# Patient Record
Sex: FEMALE | Race: ASIAN | Employment: STUDENT | URBAN - METROPOLITAN AREA
[De-identification: names, ages, dates, MRNs, and addresses within clinical notes are randomized per-mention and may not be internally consistent; named-entity substitution may affect disease eponyms.]

---
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
## 2020-02-16 ENCOUNTER — HEALTH MAINTENANCE LETTER (OUTPATIENT)
Age: 20
End: 2020-02-16

## 2020-02-26 DIAGNOSIS — L70.8 OTHER ACNE: ICD-10-CM

## 2020-02-27 RX ORDER — CLINDAMYCIN PHOSPHATE AND BENZOYL PEROXIDE 10; 50 MG/G; MG/G
GEL TOPICAL
Qty: 45 G | Refills: 11 | OUTPATIENT
Start: 2020-02-27

## 2020-03-02 ENCOUNTER — ANCILLARY PROCEDURE (OUTPATIENT)
Dept: GENERAL RADIOLOGY | Facility: CLINIC | Age: 20
End: 2020-03-02
Attending: FAMILY MEDICINE
Payer: COMMERCIAL

## 2020-03-02 ENCOUNTER — OFFICE VISIT (OUTPATIENT)
Dept: ORTHOPEDICS | Facility: CLINIC | Age: 20
End: 2020-03-02
Payer: COMMERCIAL

## 2020-03-02 VITALS — BODY MASS INDEX: 27.16 KG/M2 | HEIGHT: 65 IN | WEIGHT: 163 LBS

## 2020-03-02 DIAGNOSIS — M79.672 ACUTE FOOT PAIN, LEFT: ICD-10-CM

## 2020-03-02 DIAGNOSIS — M79.672 ACUTE FOOT PAIN, LEFT: Primary | ICD-10-CM

## 2020-03-02 ASSESSMENT — MIFFLIN-ST. JEOR: SCORE: 1507.3

## 2020-03-02 NOTE — LETTER
RE: Samra Ramirez  [address] [id number]     Dear Colleague,    Thank you for referring your patient, Samra Ramirez, to the [facility] SP[facility]TS [facility]THOPAEDIC WALK IN CLINIC at [facility]. Please see a copy of my visit note below.          SPORTS & ORTHOPEDIC WALK-IN VISIT 3/2/2020    Primary Care Physician: Dr. Ansari    Here today for left foot pain.  Has been present for roughly the last week.  She has been walking more than usual and also involved in intense class and high intensity interval training.  Pain localized to the dorsal aspect of the left foot over the metatarsal area.  Has not noted any swelling or bruising.  Did have pain in this area previously secondary to dance.  No previous diagnosis of stress fracture or bone stress injury.  Pain is worse with walking.  No pain with standing still.  It is better when she was wearing supportive footwear.  She has been resting and the pain has improved over the course the last couple of days.    Reason for visit:     What part of your body is injured / painful?  left foot    What caused the injury /pain? Overuse injury from regular activity    How long ago did your injury occur or pain begin? a week ago    What are your most bothersome symptoms? Pain    How would you characterize your symptom?  aching and dull    What makes your symptoms better? Rest    What makes your symptoms worse? Walking    Have you been previously seen for this problem? No    Medical History:    Any recent changes to your medical history? No    Any new medication prescribed since last visit? No    Have you had surgery on this body part before? No    Social History:    Occupation: student - food science    Handedness: Right    Exercise: tennis class, skating, gym     Review of Systems:    Do you have fever, chills, weight loss? No    Do you have any vision problems? No    Do you have any chest pain or edema? No    Do you have  "any shortness of breath or wheezing?  No    Do you have stomach problems? No    Do you have any numbness or focal weakness? No    Do you have diabetes? No    Do you have problems with bleeding or clotting? No    Do you have an rashes or other skin lesions? No       Past Medical History, Current Medications, and Allergies are reviewed in the electronic medical record as appropriate.       EXAM:Ht 1.638 m (5' 4.5\")   Wt 73.9 kg (163 lb)   BMI 27.55 kg/m       General: Alert, pleasant, no distress  Left foot: warm, well perfused, SILT throughout     Inspection: No soft tissue swelling, ecchymosis, deformity     ROM: Full and symmetrical foot and ankle     Strength: 5/5 in dorsiflexion, plantarflexion, inversion eversion some pain with toe walking.     Palpation: TTP over the mid to distal fourth metatarsal dorsally.  No TTP of the remaining metatarsals or midfoot.     Special Tests: None    Imaging: Three-view radiographs of the left foot are performed and reviewed independently demonstrate no acute fracture dislocation.  No significant degenerative disease.  See EMR for formal radiology report.    Assessment: Patient is a 19 year old female with suspected very early bone stress injury    Recommendations:   Reviewed imaging assessment the patient in detail  Discussed the nature of this injury and have recommended period of rest and immobilization.  At the current time patient is relatively comfortable walking her regular footwear and she would like to continue this in place of formal immobilization in boot or postop shoe.  If there is no improvement or if her pain worsens we will consider either of these 2 options.  Generally discussed return to activity and suggested that she should be pain-free walking before adding impact exercise.  Follow-up PRN.    Fawad Hu MD    "

## 2020-03-02 NOTE — PROGRESS NOTES
SPORTS & ORTHOPEDIC WALK-IN VISIT 3/2/2020    Primary Care Physician: Dr. Ansari    Here today for left foot pain.  Has been present for roughly the last week.  She has been walking more than usual and also involved in intense class and high intensity interval training.  Pain localized to the dorsal aspect of the left foot over the metatarsal area.  Has not noted any swelling or bruising.  Did have pain in this area previously secondary to dance.  No previous diagnosis of stress fracture or bone stress injury.  Pain is worse with walking.  No pain with standing still.  It is better when she was wearing supportive footwear.  She has been resting and the pain has improved over the course the last couple of days.    Reason for visit:     What part of your body is injured / painful?  left foot    What caused the injury /pain? Overuse injury from regular activity    How long ago did your injury occur or pain begin? a week ago    What are your most bothersome symptoms? Pain    How would you characterize your symptom?  aching and dull    What makes your symptoms better? Rest    What makes your symptoms worse? Walking    Have you been previously seen for this problem? No    Medical History:    Any recent changes to your medical history? No    Any new medication prescribed since last visit? No    Have you had surgery on this body part before? No    Social History:    Occupation: student - food science    Handedness: Right    Exercise: tennis class, skating, gym     Review of Systems:    Do you have fever, chills, weight loss? No    Do you have any vision problems? No    Do you have any chest pain or edema? No    Do you have any shortness of breath or wheezing?  No    Do you have stomach problems? No    Do you have any numbness or focal weakness? No    Do you have diabetes? No    Do you have problems with bleeding or clotting? No    Do you have an rashes or other skin lesions? No       Past Medical History,  "Current Medications, and Allergies are reviewed in the electronic medical record as appropriate.       EXAM:Ht 1.638 m (5' 4.5\")   Wt 73.9 kg (163 lb)   BMI 27.55 kg/m      General: Alert, pleasant, no distress  Left foot: warm, well perfused, SILT throughout     Inspection: No soft tissue swelling, ecchymosis, deformity     ROM: Full and symmetrical foot and ankle     Strength: 5/5 in dorsiflexion, plantarflexion, inversion eversion some pain with toe walking.     Palpation: TTP over the mid to distal fourth metatarsal dorsally.  No TTP of the remaining metatarsals or midfoot.     Special Tests: None      Imaging: Three-view radiographs of the left foot are performed and reviewed independently demonstrate no acute fracture dislocation.  No significant degenerative disease.  See EMR for formal radiology report.      Assessment: Patient is a 19 year old female with suspected very early bone stress injury    Recommendations:   Reviewed imaging assessment the patient in detail  Discussed the nature of this injury and have recommended period of rest and immobilization.  At the current time patient is relatively comfortable walking her regular footwear and she would like to continue this in place of formal immobilization in boot or postop shoe.  If there is no improvement or if her pain worsens we will consider either of these 2 options.  Generally discussed return to activity and suggested that she should be pain-free walking before adding impact exercise.  Follow-up PRN.    Fawad Hu MD                  "

## 2020-06-14 ENCOUNTER — MYC REFILL (OUTPATIENT)
Dept: PEDIATRICS | Facility: CLINIC | Age: 20
End: 2020-06-14

## 2020-06-14 DIAGNOSIS — L70.8 OTHER ACNE: ICD-10-CM

## 2020-06-15 NOTE — TELEPHONE ENCOUNTER
Routing refill request to provider for review/approval because:  Drug not on the [facility] refill protocol       Sonia Mcghee RN

## 2020-06-15 NOTE — TELEPHONE ENCOUNTER
clindamycin phos-benzoyl perox (DUAC) 1.2-5 % external gel     Last Written Prescription Date:  02/18/2019  Last Fill Quantity: 45g,   # refills: 11  Last Office Visit: 04/19/2019  Future Office visit:       Routing refill request to provider for review/approval because:  Drug not on the [facility], [facility] or [facility] refill protocol or controlled substance

## 2020-06-16 RX ORDER — CLINDAMYCIN PHOSPHATE AND BENZOYL PEROXIDE 10; 50 MG/G; MG/G
GEL TOPICAL
Qty: 45 G | Refills: 11 | Status: SHIPPED | OUTPATIENT
Start: 2020-06-16 | End: 2021-09-09

## 2020-06-29 NOTE — PROGRESS NOTES
"SUBJECTIVE:     Samra Ramirez is a 19 year old female, here for a routine health maintenance visit.    Patient was roomed by: Charlee Mcdonough MA    [facility]    {Reference  [facility] Pediatric TB Risk Assessment & Follow-Up Options :055955}    Dental visit recommended: Yes,planning on geting to the dentist  Has had dental varnish applied in past 30 days: date planning to do    Cardiac risk assessment:     Family history (males <55, females <65) of angina (chest pain), heart attack, heart surgery for clogged arteries, or stroke: no    Biological parent(s) with a total cholesterol over 240:  no  Dyslipidemia risk:    None  MenB Vaccine: series already completed.    VISION    Corrective lenses: No corrective lenses (H Plus Lens Screening required)  Tool used: Miramontes  Right eye: 10/12.5 (20/25)  Left eye: 10/10 (20/20)  Two Line Difference: no  Visual Acuity: Pass  HEARING   Right Ear:      1000 Hz RESPONSE- on Level: 40 db (Conditioning sound)   1000 Hz: RESPONSE- on Level:   20 db    2000 Hz: RESPONSE- on Level:   20 db    4000 Hz: RESPONSE- on Level:   20 db    6000 Hz: RESPONSE- on Level:   20 db     Left Ear:      6000 Hz: RESPONSE- on Level:   20 db    4000 Hz: RESPONSE- on Level:   20 db    2000 Hz: RESPONSE- on Level:   20 db    1000 Hz: RESPONSE- on Level:   20 db      500 Hz: RESPONSE- on Level: 25 db    Right Ear:       500 Hz: RESPONSE- on Level: 25 db    Hearing Acuity: Pass    Hearing Assessment: normal    PSYCHO-SOCIAL/DEPRESSION  General screening:  {PSC 12-20y:705492}  {PROVIDER INTERVIEW--Depression/Mental health  What do you do to make yourself feel better when you're stressed?  Have you ever had low moods that lasted more than a few hours?  A few days?  Have your moods ever been so low that you thought      of hurting yourself?  Did you act on those      thoughts?  Tell me about that.  If you had those kinds of thoughts in the future,      which adult could you tell?  :139857::\"No " "concerns\"}    ACTIVITIES:  {PROVIDER INTERVIEW--Activities   How do you spend your free time?      After-school activities?   Tell me about your friends.   What, if any, physical activity do you do regularly?      Tell me about that.  :534911}    DRUGS  {PROVIDER INTERVIEW--Drugs  Have you tried alcohol?  Tobacco?  Other drugs?        Prescription drugs?  Tell me more.  Has your use ever gotten you in trouble?  Do family members use any of the above?  :497504::\"Smoking:  no\",\"Passive smoke exposure:  no\",\"Alcohol:  no\",\"Drugs:  no\"}    SEXUALITY  {PROVIDER INTERVIEW--Sexuality  Have you developed feelings of attraction for others?  Have your feelings of               attraction ever caused you distress?  Tell me about that.  Have you explored a physical relationship with anyone (held hands, kissed, had      oral sex, had penis-in-vagina sex)?  (If yes--Have you ever gotten/gotten someone       pregnant?  Have you ever had a sexually       transmitted diseases?  Do you use birth control?        What kind?)  Has anyone ever approached you or touched you in       a way that was unwanted?  Have you ever been      physically or psychologically mistreated by      anyone?  Tell me about that.  :336958}    {Female Menstrual History (Optional):990573}    PROBLEM LIST  Patient Active Problem List   Diagnosis     NO ACTIVE PROBLEMS     Acne     MEDICATIONS  Current Outpatient Medications   Medication Sig Dispense Refill     clindamycin phos-benzoyl perox (DUAC) 1.2-5 % external gel APPLY EXTERNALLY TO THE AFFECTED AREA EVERY NIGHT AT BEDTIME FOR ACNE 45 g 11      ALLERGY  No Known Allergies    IMMUNIZATIONS  Immunization History   Administered Date(s) Administered     DTAP (<7y) 09/27/2001, 11/05/2001, 02/04/2002, 11/04/2002, 08/08/2005     HEPA 04/27/2007, 11/02/2007     HPV 12/07/2011, 03/14/2012, 07/05/2012     Hepatitis B Immunity: Titer 02/04/2002     Hib (PRP-T) 09/27/2001, 02/04/2002     Influenza (H1N1) 12/10/2009, " "01/14/2010     Influenza (IIV3) PF 11/04/2002, 12/04/2002, 10/27/2003, 12/15/2005, 11/06/2006, 11/02/2007, 11/07/2008, 10/21/2009, 10/13/2010, 10/20/2011, 11/21/2012     Influenza Intranasal Vaccine 4 valent 12/02/2014     Influenza Vaccine IM > 6 months Valent IIV4 11/12/2013, 10/12/2018     MMR 09/27/2001, 08/08/2005     Meningococcal (Bexsero ) 08/22/2018, 10/12/2018     Meningococcal (Menactra ) 12/07/2011, 08/22/2018     Pneumococcal (PCV 7) 06/12/2002     Poliovirus, inactivated (IPV) 09/27/2001, 02/04/2002, 11/04/2002, 08/08/2005     TDAP Vaccine (Boostrix) 12/07/2011     Typhoid Oral 04/27/2007     Varicella 12/03/2001, 11/02/2007       HEALTH HISTORY SINCE LAST VISIT  {HEALTH  1:604079::\"No surgery, major illness or injury since last physical exam\"}    ROS  {ROS Choices:880960}    OBJECTIVE:   EXAM  There were no vitals taken for this visit.  No height on file for this encounter.  No weight on file for this encounter.  No height and weight on file for this encounter.  Blood pressure percentiles are not available for patients who are 18 years or older.  {TEEN GENERAL EXAM 9 - 18 Y:287052::\"GENERAL: Active, alert, in no acute distress.\",\"SKIN: Clear. No significant rash, abnormal pigmentation or lesions\",\"HEAD: Normocephalic\",\"EYES: Pupils equal, round, reactive, Extraocular muscles intact. Normal conjunctivae.\",\"EARS: Normal canals. Tympanic membranes are normal; gray and translucent.\",\"NOSE: Normal without discharge.\",\"MOUTH/THROAT: Clear. No oral lesions. Teeth without obvious abnormalities.\",\"NECK: Supple, no masses.  No thyromegaly.\",\"LYMPH NODES: No adenopathy\",\"LUNGS: Clear. No rales, rhonchi, wheezing or retractions\",\"HEART: Regular rhythm. Normal S1/S2. No murmurs. Normal pulses.\",\"ABDOMEN: Soft, non-tender, not distended, no masses or hepatosplenomegaly. Bowel sounds normal. \",\"NEUROLOGIC: No focal findings. Cranial nerves grossly intact: DTR's normal. Normal gait, strength and tone\",\"BACK: Spine " "is straight, no scoliosis.\",\"EXTREMITIES: Full range of motion, no deformities\"}  {/Sports exams:306378}    ASSESSMENT/PLAN:   {Diagnosis Picklist:670571}    Anticipatory Guidance  {ANTICIPATORY 15-18 Y:463543::\"The following topics were discussed:\",\"SOCIAL/ FAMILY:\",\"NUTRITION:\",\"HEALTH / SAFETY:\",\"SEXUALITY:\"}    Preventive Care Plan  Immunizations    {Vaccine counseling is expected when vaccines are given for the first time.   Vaccine counseling would not be expected for subsequent vaccines (after the first of the series) unless there is significant additional documentation:128843::\"Reviewed, up to date\"}  Referrals/Ongoing Specialty care: {C&TC :499341::\"No \"}  See other orders in [facility].  Cleared for sports:  {Yes No Not addressed:249044::\"Yes\"}  BMI at No height and weight on file for this encounter.  {BMI Evaluation - If BMI >/= 85th percentile for age, complete Obesity Action Plan:822351::\"No weight concerns.\"}    FOLLOW-UP:    {  (Optional):147181::\"in 1 year for a Preventive Care visit\"}    Resources  HPV and Cancer Prevention:  What Parents Should Know  What Kids Should Know About HPV and Cancer  Goal Tracker: Be More Active  Goal Tracker: Less Screen Time  Goal Tracker: Drink More Water  Goal Tracker: Eat More Fruits and Veggies  [place] Child and Teen Checkups (C&TC) Schedule of Age-Related Screening Standards    Apolonia Ordoñez MD  [facility] CHILDRENS  "

## 2020-06-29 NOTE — PATIENT INSTRUCTIONS
Patient Education    [facility]- PARENT  15 THROUGH 17 YEAR VISITS  Here are some suggestions from [employer] experts that may be of value to your family.     HOW YOUR FAMILY IS DOING  Set aside time to be with your teen and really listen to her hopes and concerns.  Support your teen in finding activities that interest him. Encourage your teen to help others in the community.  Help your teen find and be a part of positive after-school activities and sports.  Support your teen as she figures out ways to deal with stress, solve problems, and make decisions.  Help your teen deal with conflict.  If you are worried about your living or food situation, talk with us. Community agencies and programs such as SNAP can also provide information.    YOUR GROWING AND CHANGING TEEN  Make sure your teen visits the dentist at least twice a year.  Give your teen a fluoride supplement if the dentist recommends it.  Support your teen s healthy body weight and help him be a healthy eater.  Provide healthy foods.  Eat together as a family.  Be a role model.  Help your teen get enough calcium with low-fat or fat-free milk, low-fat yogurt, and cheese.  Encourage at least 1 hour of physical activity a day.  Praise your teen when she does something well, not just when she looks good.    YOUR TEEN S FEELINGS  If you are concerned that your teen is sad, depressed, nervous, irritable, hopeless, or angry, let us know.  If you have questions about your teen s sexual development, you can always talk with us.    HEALTHY BEHAVIOR CHOICES  Know your teen s friends and their parents. Be aware of where your teen is and what he is doing at all times.  Talk with your teen about your values and your expectations on drinking, drug use, tobacco use, driving, and sex.  Praise your teen for healthy decisions about sex, tobacco, alcohol, and other drugs.  Be a role model.  Know your teen s friends and their activities together.  Lock your  liquor in a cabinet.  Store prescription medications in a locked cabinet.  Be there for your teen when she needs support or help in making healthy decisions about her behavior.    SAFETY  Encourage safe and responsible driving habits.  Lap and shoulder seat belts should be used by everyone.  Limit the number of friends in the car and ask your teen to avoid driving at night.  Discuss with your teen how to avoid risky situations, who to call if your teen feels unsafe, and what you expect of your teen as a .  Do not tolerate drinking and driving.  If it is necessary to keep a gun in your home, store it unloaded and locked with the ammunition locked separately from the gun.      Consistent with Bright Futures: Guidelines for Health Supervision of Infants, Children, and Adolescents, 4th Edition  For more information, go to https://brightfutures.aap.org.           Patient Education

## 2020-06-30 ENCOUNTER — OFFICE VISIT (OUTPATIENT)
Dept: PEDIATRICS | Facility: CLINIC | Age: 20
End: 2020-06-30
Payer: COMMERCIAL

## 2020-06-30 VITALS
WEIGHT: 158.6 LBS | BODY MASS INDEX: 26.42 KG/M2 | HEART RATE: 101 BPM | HEIGHT: 65 IN | TEMPERATURE: 98.1 F | DIASTOLIC BLOOD PRESSURE: 80 MMHG | SYSTOLIC BLOOD PRESSURE: 113 MMHG

## 2020-06-30 DIAGNOSIS — Z00.129 ENCOUNTER FOR ROUTINE CHILD HEALTH EXAMINATION W/O ABNORMAL FINDINGS: Primary | ICD-10-CM

## 2020-06-30 PROCEDURE — 99395 PREV VISIT EST AGE 18-39: CPT | Performed by: PEDIATRICS

## 2020-06-30 PROCEDURE — 96127 BRIEF EMOTIONAL/BEHAV ASSMT: CPT | Performed by: PEDIATRICS

## 2020-06-30 PROCEDURE — 99173 VISUAL ACUITY SCREEN: CPT | Mod: 59 | Performed by: PEDIATRICS

## 2020-06-30 PROCEDURE — 92551 PURE TONE HEARING TEST AIR: CPT | Performed by: PEDIATRICS

## 2020-06-30 ASSESSMENT — MIFFLIN-ST. JEOR: SCORE: 1487.34

## 2020-06-30 NOTE — PROGRESS NOTES
SUBJECTIVE:   Samra Ramirez is a 19 year old female, here for a routine health maintenance visit,   accompanied by her self.    Patient was roomed by: Charlee Mcdonough MA    Do you have any forms to be completed?  YES    SOCIAL HISTORY  Family members in house: mother  Language(s) spoken at home: English  Recent family changes/social stressors: none noted    SAFETY/HEALTH RISKS  TB exposure:           None  Cardiac risk assessment:     Family history (males <55, females <65) of angina (chest pain), heart attack, heart surgery for clogged arteries, or stroke: no    Biological parent(s) with a total cholesterol over 240:  Family history not known  Dyslipidemia risk:    None  MenB Vaccine not indicated.    DENTAL  Water source:  city water  Does your child have a dental provider: Yes  Has your child seen a dentist in the last 6 months: Yes  Dental health HIGH risk factors: none    Dental visit recommended: Yes  Dental varnish declined by parent    Sports Physical:  SPORTS QUESTIONNAIRE:  ======================   School: [employer]                          Grade: Salvatore year in college                 Sports:  Figure skating (Hockey cheerleading)  1.  no - Do you have any concerns that you would like to discuss with your provider?  2.  no - Has a provider ever denied or restricted your participation in sports for any reason?  3.  no - Do you have any ongoing medical issues or recent illness?  4.  no - Have you ever passed out or nearly passed out during or after exercise?   5.  no - Have you ever had discomfort, pain, tightness, or pressure in your chest during exercise?  6.  no - Does your heart ever race, flutter in your chest, or skip beats (irregular beats) during exercise?   7.  no - Has a doctor ever told you that you have any heart problems?  8.  no - Has a doctor ever ordered a test for your heart? For example, electrocardiography (ECG) or echocardiolography (ECHO)?  9.  no - Do you get  lightheaded or feel shorter of breath than your friends during exercise?   10.  no - Have you ever had seizure?   11.  no - Has any family member or relative  of heart problems or had an unexpected or unexplained sudden death before age 35 years (including drowning or unexplained car crash)?  12.  no - Does anyone in your family have a genetic heart problem such as hypertrophic cardiomyopathy (HCM), Marfan Syndrome, arrhythmogenic right ventricular cardiomyopathy (ARVC), long QT syndrome (LQTS), short QT syndrome (SQTS), Brugada syndrome, or catecholaminergic polymorphic ventricular tachycardia (CPVT)?    13.  no - Has anyone in your family had a pacemaker, or implanted defibrillator before age 35?   14.  no - Have you ever had a stress fracture or an injury to a bone, muscle, ligament, joint or tendon that caused you to miss a practice or game?   15.  no - Do you have a bone, muscle, ligament, or joint injury that bothers you?   16.  no - Do you cough, wheeze, or have difficulty breathing during or after exercise?    17.  no -  Are you missing a kidney, an eye, a testicle (males), your spleen, or any other organ?  18.  no - Do you have groin or testicle pain or a painful bulge or hernia in the groin area?  19.  no - Do you have any recurring skin rashes or rashes that come and go, including herpes or methicillin-resistant Staphylococcus aureus (MRSA)?  20.  no - Have you had a concussion or head injury that caused confusion, a prolonged headache, or memory problems?  21. no - Have you ever had numbness, tingling or weakness in your arms or legs or been unable to move your arms or legs after being hit or falling?   22.  no - Have you ever become ill while exercising in the heat?  23.  no - Do you or does someone in your family have sickle cell trait or disease?   24.  no - Have you ever had, or do you have any problems with your eyes or vision?  25.  no - Do you worry about your weight?    26.  no -  Are you  trying to or has anyone recommended that you gain or lose weight?    27.  no -  Are you on a special diet or do you avoid certain types of foods or food groups?  28.  no - Have you ever had an eating disorder?   29. YES - Have you ever had a menstrual period?  30.  How old were you when you had your first menstrual period? 12 years old   31.  When was your most recent  menstrual period?  June 10th, 2020 (two weeks ago)  32. How many menstrual periods have you had in the 12 months?  13  VISION    Corrective lenses: No corrective lenses (H Plus Lens Screening required)  Tool used: Miramontes  Right eye: 10/12.5 (20/25)  Left eye: 10/10 (20/20)  Two Line Difference: no  Visual Acuity: Pass  HEARING   Right Ear:      1000 Hz RESPONSE- on Level: 40 db (Conditioning sound)   1000 Hz: RESPONSE- on Level:   20 db    2000 Hz: RESPONSE- on Level:   20 db    4000 Hz: RESPONSE- on Level:   20 db    6000 Hz: RESPONSE- on Level:   20 db     Left Ear:      6000 Hz: RESPONSE- on Level:   20 db    4000 Hz: RESPONSE- on Level:   20 db    2000 Hz: RESPONSE- on Level:   20 db    1000 Hz: RESPONSE- on Level:   20 db      500 Hz: RESPONSE- on Level: 25 db    Right Ear:       500 Hz: RESPONSE- on Level: 25 db    Hearing Acuity: Pass    Hearing Assessment: normal      HOME  No concerns    EDUCATION  School:  [employer]  Grade:  Salvatore years  Days of school missed: 5 or fewer  School performance / Academic skills: Happy with grades.    SAFETY  Driving:  Seat belt always worn:  Yes  Helmet worn for bicycle/roller blades/skateboard:  Yes  Guns/firearms in the home: No  No safety concerns    ACTIVITIES  Do you get at least 60 minutes per day of physical activity, including time in and out of school: Yes  Extracurricular activities: no  Organized team sports: Figure skating and Hockey cheerleading (figure skating)    During COVID-19 changes, was already doing two on-line classes.    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  Physical activity: Stretching, yoga, walking outside.  Rinks are opening up,and will be able to go skating.  None    ELECTRONIC MEDIA  Media use: > 2 hours/ day      DIET  Do you get at least 4 helpings of a fruit or vegetable every day: Yes  How many servings of juice, non-diet soda, punch or sports drinks per day: Yes  Meals:  Three meals a day with snacks , Body image/shape:  No concerns     and Supplements:  None    PSYCHO-SOCIAL/DEPRESSION  General screening:    No concerns    SLEEP  Sleep concerns: No concerns, sleeps well through night    QUESTIONS/CONCERNS: None    DRUGS  Smoking:  no  Passive smoke exposure:  no  Alcohol:  YES: Occassional  Drugs:  no    SEXUALITY  Sexual attraction:  opposite sex  Sexual activity: No  Contraception/STI Prevention: Abstinence  Unwanted sex:  None    MENSTRUAL HISTORY  Normal       PROBLEM LIST  Patient Active Problem List   Diagnosis     NO ACTIVE PROBLEMS     Acne     MEDICATIONS  Current Outpatient Medications   Medication Sig Dispense Refill     clindamycin phos-benzoyl perox (DUAC) 1.2-5 % external gel APPLY EXTERNALLY TO THE AFFECTED AREA EVERY NIGHT AT BEDTIME FOR ACNE 45 g 11      ALLERGY  No Known Allergies    IMMUNIZATIONS  Immunization History   Administered Date(s) Administered     DTAP (<7y) 09/27/2001, 11/05/2001, 02/04/2002, 11/04/2002, 08/08/2005     HEPA 04/27/2007, 11/02/2007     HPV 12/07/2011, 03/14/2012, 07/05/2012     Hepatitis B Immunity: Titer 02/04/2002     Hib (PRP-T) 09/27/2001, 02/04/2002     Influenza (H1N1) 12/10/2009, 01/14/2010     Influenza (IIV3) PF 11/04/2002, 12/04/2002, 10/27/2003, 12/15/2005, 11/06/2006, 11/02/2007, 11/07/2008, 10/21/2009, 10/13/2010, 10/20/2011, 11/21/2012     Influenza Intranasal Vaccine 4 valent 12/02/2014     Influenza Vaccine IM > 6 months Valent IIV4 11/12/2013, 10/12/2018     MMR 09/27/2001, 08/08/2005     Meningococcal (Bexsero ) 08/22/2018, 10/12/2018     Meningococcal  "(Menactra ) 12/07/2011, 08/22/2018     Pneumococcal (PCV 7) 06/12/2002     Poliovirus, inactivated (IPV) 09/27/2001, 02/04/2002, 11/04/2002, 08/08/2005     TDAP Vaccine (Boostrix) 12/07/2011     Typhoid Oral 04/27/2007     Varicella 12/03/2001, 11/02/2007       HEALTH HISTORY SINCE LAST VISIT  No surgery, major illness or injury since last physical exam    ROS  GENERAL:  NEGATIVE for fever, poor appetite, and sleep disruption.  SKIN:  NEGATIVE for rash, hives, and eczema.  EYE:  NEGATIVE for pain, discharge, redness, itching and vision problems.  ENT:  NEGATIVE for ear pain, runny nose, congestion and sore throat.  RESP:  NEGATIVE for cough, wheezing, and difficulty breathing.  CARDIAC:  NEGATIVE for chest pain and cyanosis.   GI:  NEGATIVE for vomiting, diarrhea, abdominal pain and constipation.  :  NEGATIVE for urinary problems.  NEURO:  NEGATIVE for headache and weakness.  ALLERGY:  As in Allergy History  MSK:  NEGATIVE for muscle problems and joint problems.    OBJECTIVE:   EXAM  /80   Pulse 101   Temp 98.1  F (36.7  C) (Oral)   Ht 5' 4.5\" (1.638 m)   Wt 158 lb 9.6 oz (71.9 kg)   LMP 06/10/2020   Breastfeeding No   BMI 26.80 kg/m    53 %ile (Z= 0.08) based on CDC (Girls, 2-20 Years) Stature-for-age data based on Stature recorded on 6/30/2020.  86 %ile (Z= 1.10) based on CDC (Girls, 2-20 Years) weight-for-age data using vitals from 6/30/2020.  86 %ile (Z= 1.10) based on CDC (Girls, 2-20 Years) BMI-for-age based on BMI available as of 6/30/2020.  Blood pressure percentiles are not available for patients who are 18 years or older.  GENERAL: Active, alert, in no acute distress.  SKIN: Clear. No significant rash, abnormal pigmentation or lesions  HEAD: Normocephalic  EYES: Pupils equal, round, reactive, Extraocular muscles intact. Normal conjunctivae.  EARS: Normal canals. Tympanic membranes are normal; gray and translucent.  NOSE: Normal without discharge.  MOUTH/THROAT: Clear. No oral lesions. Teeth " without obvious abnormalities.  NECK: Supple, no masses.  No thyromegaly.  LYMPH NODES: No adenopathy  LUNGS: Clear. No rales, rhonchi, wheezing or retractions  HEART: Regular rhythm. Normal S1/S2. No murmurs. Normal pulses.  ABDOMEN: Soft, non-tender, not distended, no masses or hepatosplenomegaly. Bowel sounds normal.   NEUROLOGIC: No focal findings. Cranial nerves grossly intact: DTR's normal. Normal gait, strength and tone  BACK: Spine is straight, no scoliosis.  EXTREMITIES: Full range of motion, no deformities  : Exam not indicated    ASSESSMENT/PLAN:   1. Encounter for routine child health examination w/o abnormal findings    - PURE TONE HEARING TEST, AIR  - SCREENING, VISUAL ACUITY, QUANTITATIVE, BILAT  - BEHAVIORAL / EMOTIONAL ASSESSMENT [[id number]]    Anticipatory Guidance  The following topics were discussed:  SOCIAL/ FAMILY:    Social media    School/ homework    Future plans/ College  NUTRITION:    Healthy food choices    Family meals    Weight management  HEALTH / SAFETY:    Adequate sleep/ exercise    Drugs, ETOH, smoking    Swimming/ water safety    Bike/ sport helmets  SEXUALITY:    Dating/ relationships    Safe sex/ STDs    Preventive Care Plan  Immunizations    Reviewed, up to date  Referrals/Ongoing Specialty care: No   See other orders in [facility].  Cleared for sports:  Yes  BMI at 86 %ile (Z= 1.10) based on CDC (Girls, 2-20 Years) BMI-for-age based on BMI available as of 6/30/2020.  No weight concerns.    FOLLOW-UP:    in 1 year for a Preventive Care visit    Resources  HPV and Cancer Prevention:  What Parents Should Know  What Kids Should Know About HPV and Cancer  Goal Tracker: Be More Active  Goal Tracker: Less Screen Time  Goal Tracker: Drink More Water  Goal Tracker: Eat More Fruits and Veggies  [place] Child and Teen Checkups (C&TC) Schedule of Age-Related Screening Standards    Apolonia Ordoñez MD  [facility]

## 2021-09-08 SDOH — ECONOMIC STABILITY: INCOME INSECURITY: IN THE LAST 12 MONTHS, WAS THERE A TIME WHEN YOU WERE NOT ABLE TO PAY THE MORTGAGE OR RENT ON TIME?: NO

## 2021-09-09 ENCOUNTER — OFFICE VISIT (OUTPATIENT)
Dept: PEDIATRICS | Facility: CLINIC | Age: 21
End: 2021-09-09
Payer: COMMERCIAL

## 2021-09-09 VITALS
DIASTOLIC BLOOD PRESSURE: 81 MMHG | HEART RATE: 85 BPM | BODY MASS INDEX: 27.12 KG/M2 | HEIGHT: 65 IN | WEIGHT: 162.8 LBS | SYSTOLIC BLOOD PRESSURE: 128 MMHG | TEMPERATURE: 98.1 F

## 2021-09-09 DIAGNOSIS — Z00.129 ENCOUNTER FOR ROUTINE CHILD HEALTH EXAMINATION W/O ABNORMAL FINDINGS: Primary | ICD-10-CM

## 2021-09-09 DIAGNOSIS — L70.0 ACNE VULGARIS: ICD-10-CM

## 2021-09-09 PROCEDURE — 90715 TDAP VACCINE 7 YRS/> IM: CPT | Performed by: PEDIATRICS

## 2021-09-09 PROCEDURE — 90471 IMMUNIZATION ADMIN: CPT | Performed by: PEDIATRICS

## 2021-09-09 PROCEDURE — 96127 BRIEF EMOTIONAL/BEHAV ASSMT: CPT | Performed by: PEDIATRICS

## 2021-09-09 PROCEDURE — 99395 PREV VISIT EST AGE 18-39: CPT | Mod: 25 | Performed by: PEDIATRICS

## 2021-09-09 PROCEDURE — 90472 IMMUNIZATION ADMIN EACH ADD: CPT | Performed by: PEDIATRICS

## 2021-09-09 PROCEDURE — 90686 IIV4 VACC NO PRSV 0.5 ML IM: CPT | Performed by: PEDIATRICS

## 2021-09-09 RX ORDER — CLINDAMYCIN PHOSPHATE AND BENZOYL PEROXIDE 10; 50 MG/G; MG/G
GEL TOPICAL
Qty: 45 G | Refills: 4 | Status: SHIPPED | OUTPATIENT
Start: 2021-09-09

## 2021-09-09 ASSESSMENT — MIFFLIN-ST. JEOR: SCORE: 1505.59

## 2021-09-09 NOTE — PATIENT INSTRUCTIONS
Patient Education    [facility] PATIENT  18 THROUGH 21 YEAR VISITS  Here are some suggestions from [employer] experts that may be of value to your family.     HOW YOU ARE DOING  Enjoy spending time with your family.  Find activities you are really interested in, such as sports, theater, or volunteering.  Try to be responsible for your schoolwork or work obligations.  Always talk through problems and never use violence.  If you get angry with someone, try to walk away.  If you feel unsafe in your home or have been hurt by someone, let us know. Hotlines and community agencies can also provide confidential help.  Talk with us if you are worried about your living or food situation. Community agencies and programs such as SNAP can help.  Don t smoke, vape, or use drugs. Avoid people who do when you can. Talk with us if you are worried about alcohol or drug use in your family.    YOUR DAILY LIFE  Visit the dentist at least twice a year.  Brush your teeth at least twice a day and floss once a day.  Be a healthy eater.  Have vegetables, fruits, lean protein, and whole grains at meals and snacks.  Limit fatty, sugary, salty foods that are low in nutrients, such as candy, chips, and ice cream.  Eat when you re hungry. Stop when you feel satisfied.  Eat breakfast.  Drink plenty of water.  Make sure to get enough calcium every day.  Have 3 or more servings of low-fat (1%) or fat-free milk and other low-fat dairy products, such as yogurt and cheese.  Women: Make sure to eat foods rich in folate, such as fortified grains and dark- green leafy vegetables.  Aim for at least 1 hour of physical activity every day.  Wear safety equipment when you play sports.  Get enough sleep.  Talk with us about managing your health care and insurance as an adult.    YOUR FEELINGS  Most people have ups and downs. If you are feeling sad, depressed, nervous, irritable, hopeless, or angry, let us know or reach out to another health  care professional.  Figure out healthy ways to deal with stress.  Try your best to solve problems and make decisions on your own.  Sexuality is an important part of your life. If you have any questions or concerns, we are here for you.    HEALTHY BEHAVIOR CHOICES  Avoid using drugs, alcohol, tobacco, steroids, and diet pills. Support friends who choose not to use.  If you use drugs or alcohol, let us know or talk with another trusted adult about it. We can help you with quitting or cutting down on your use.  Make healthy decisions about your sexual behavior.  If you are sexually active, always practice safe sex. Always use birth control along with a condom to prevent pregnancy and sexually transmitted infections.  All sexual activity should be something you want. No one should ever force or try to convince you.  Protect your hearing at work, home, and concerts. Keep your earbud volume down.    STAYING SAFE  Always be a safe and cautious .  Insist that everyone use a lap and shoulder seat belt.  Limit the number of friends in the car and avoid driving at night.  Avoid distractions. Never text or talk on the phone while you drive.  Do not ride in a vehicle with someone who has been using drugs or alcohol.  If you feel unsafe driving or riding with someone, call someone you trust to drive you.  Wear helmets and protective gear while playing sports. Wear a helmet when riding a bike, a motorcycle, or an ATV or when skiing or skateboarding.  Always use sunscreen and a hat when you re outside.  Fighting and carrying weapons can be dangerous. Talk with your parents, teachers, or doctor about how to avoid these situations.        Consistent with Bright Futures: Guidelines for Health Supervision of Infants, Children, and Adolescents, 4th Edition  For more information, go to https://brightfutures.aap.org.

## 2021-09-09 NOTE — PROGRESS NOTES
Samra Ramirez is 20 year old, here for a preventive care visit.    Assessment & Plan   033638}  (Z00.129) Encounter for routine child health examination w/o abnormal findings  (primary encounter diagnosis)  Comment:  Doing well.  Plan: BEHAVIORAL/EMOTIONAL ASSESSMENT (45383),         SCREENING TEST, PURE TONE, AIR ONLY, SCREENING,        VISUAL ACUITY, QUANTITATIVE, BILAT, TDAP         VACCINE (Adacel, Boostrix)  [[id number]],         INFLUENZA VACCINE IM >6 MO VALENT IIV4         (ALFURIA/FLUZONE)        Was nervous and a little tearful when I asked more sensitive questions.  Showed patient EFT tapping to help calm stress response, and gave handout.    (L70.0) Acne vulgaris  Comment: Renewed Rx for this, as current tube is running out.  Plan: clindamycin phos-benzoyl perox (DUAC) 1.2-5 %         external gel         Growth        No weight concerns.    Immunizations     Vaccines up to date.  Appropriate vaccinations were ordered.  MenB Vaccine not indicated.    Anticipatory Guidance    Reviewed age appropriate anticipatory guidance.   The following topics were discussed:  SOCIAL/ FAMILY:    Increased responsibility    Parent/ teen communication    Limits/ consequences    Social media    TV/ media    Future plans/ College  NUTRITION:    Healthy food choices    Family meals    Weight management  HEALTH / SAFETY:    Adequate sleep/ exercise    Dental care    Drugs, ETOH, smoking    Body image    Seat belts  SEXUALITY:    Dating/ relationships    Contraception     Safe sex/ STDs        Referrals/Ongoing Specialty Care  No    Follow Up      No follow-ups on file.    Patient has been advised of split billing requirements and indicates understanding: Yes      Subjective     Additional Questions 9/9/2021   Do you have any questions today that you would like to discuss? Yes   Questions adult referral       Social 9/8/2021   Who do you live with? Family   Have you experienced any stressful events recently? None   In the past  12 months, has lack of transportation kept you from medical appointments or from getting medications? No   In the last 12 months, was there a time when you were not able to pay the mortgage or rent on time? No   In the last 12 months, was there a time when you did not have a steady place to sleep or slept in a shelter (including now)? No       Health Risks/Safety 9/8/2021   Do you always wear a seat belt? Yes   Do you wear a helmet for bicyle, rollerblades, skatebard, scooter, skiing/snowboarding, ATV/snowmobile, motorcycle?  Yes       TB Screening 9/8/2021   Were you born outside of the [place]? (!) YES   Which country?  [place]     TB Screening 9/8/2021   Since your last Well Child visit, have any of your family members or close contacts had tuberculosis or a positive tuberculosis test?  No   Since your last check-up, have you or any of your family members or close contacts traveled or lived outside of the [place]? No   Since your last check-up, have you lived in a high-risk group setting like a correctional facility, health care facility, homeless shelter, or refugee camp? No           Dyslipidemia Screening 9/8/2021   Have any of your parents or grandparents had a stroke or heart attack before age 55 for males or before age 65 for females? (!) UNKNOWN   Do either of your parents have high cholesterol or currently taking medications to treat? (!) UNKNOWN    Risk Factors: None    No flowsheet data found.  Dental Fluoride Varnish:   No, No.  Patient has her own dentist she sees regularly..  Diet 9/8/2021   Do you have questions about your eating?  No   Do you have questions about your weight?  No   What do you regularly drink? Water, Cow's Milk, (!) JUICE   What type of water? Tap, (!) FILTERED   Do you think you eat healthy foods? Yes   Do you get at least 3 servings of food or beverages that have calcium each day (dairy, green leafy vegetables, etc.)? Yes   How would you describe your diet?  No  restrictions   Within the past 12 months, you worried that your food would run out before you got money to buy more. Never true   Within the past 12 months, the food you bought just didn't last and you didn't have money to get more. Never true       Activity 9/8/2021   On average, how many days per week do you engage in moderate to strenuous exercise (like walking fast, running, jogging, dancing, swimming, biking, or other activities that cause a light or heavy sweat)? 3 days   On average, how many minutes do you engage in exercise at this level? 60 minutes   What do you do for exercise? Walking, ice skating, swimming   What activities are you involved with? [employer], [facility] [employer] program, food science and nutrition club     Media Use 9/8/2021   How many hours per day are you viewing a screen?  6     Sleep 9/8/2021   Do you have any trouble with sleep? No     Vision/Hearing 9/8/2021   Do you have any concerns about your hearing or vision?  No concerns       School 9/8/2021   Are you in school? Yes   What school do you attend?  [employer]   What do you do for work? [employer],        Psycho-Social/Depression  General screening:    Electronic PSC   PSC SCORES 8/22/2018   Inattentive / Hyperactive Symptoms Subtotal 0   Externalizing Symptoms Subtotal 0   Internalizing Symptoms Subtotal 1   PSC - 17 Total Score 1      no followup necessary    Teen Screen    HOME:  Lives at home to save money (will be graduating at the end of this semester).    EDUCATION  School: Senior at the [employer] undergraduate.  Food Science major.  Would like to do .  Grades: Doing well.    ACTIVITIES:  Hobbies:  Recreational reading, Netflix  Sports: At the [employer] club, skates for fun    DIET:  Pop and processed foods: trying to eat fewer processed foods  Whole foods: tries to emphasize that  Concerns: None    DRUGS:  Vaping; never  EtOH: safe  "practices  Tobacco: never  Marijuana: never  Other: none    SLEEP:  Lights off on school night: 11pm or midnight  Asleep quickly  Wakes up on school day: up by 8 am.  Naps: none    SEXUALITY:  Gender: Cis  Attraction: males  Activity: Never yet  Concerns: None    MOODS:  Anxious thoughts or feelings: none  Sad/angry/ low-mood thoughts or feelings: none  Ever thought of hurting self: never  Coping strategies: reading, connecting with family, relaxing      [id number]}  AMB [facility] MENSES SECTION 9/8/2021   What are your periods like?  Regular       General:  normal energy and appetite.  Skin:  no rash, hives, other lesions.  Eyes:  no pain, discharge, redness, itching.  ENT:  no earache, sneezing, nasal congestion, sinus pain.  Respiratory:  no cough, wheeze, respiratory distress.  Cardiovascular:  no tachycardia, palpitations, syncope.  Gastrointestinal:  no nausea, vomiting, diarrhea, constipation, abdominal pain.  Musculoskeletal:  no myalgia or arthralgia.       Objective     Exam  /81   Pulse 85   Temp 98.1  F (36.7  C) (Oral)   Ht 5' 4.76\" (1.645 m)   Wt 162 lb 12.8 oz (73.8 kg)   BMI 27.29 kg/m    Facility age limit for growth percentiles is 20 years.  Facility age limit for growth percentiles is 20 years.  Facility age limit for growth percentiles is 20 years.  Growth percentile SmartLinks can only be used for patients less than 20 years old.  GENERAL: Active, alert, in no acute distress.  SKIN: Clear. No significant rash, abnormal pigmentation or lesions  HEAD: Normocephalic  EYES: Pupils equal, round, reactive, Extraocular muscles intact. Normal conjunctivae.  EARS: Normal canals. Tympanic membranes are normal; gray and translucent.  NOSE: Normal without discharge.  MOUTH/THROAT: Clear. No oral lesions. Teeth without obvious abnormalities.  NECK: Supple, no masses.  No thyromegaly.  LYMPH NODES: No adenopathy  LUNGS: Clear. No rales, rhonchi, wheezing or retractions  HEART: Regular rhythm. Normal S1/S2. " No murmurs. Normal pulses.  ABDOMEN: Soft, non-tender, not distended, no masses or hepatosplenomegaly. Bowel sounds normal.   NEUROLOGIC: No focal findings. Cranial nerves grossly intact: DTR's normal. Normal gait, strength and tone  BACK: Spine is straight, no scoliosis.  EXTREMITIES: Full range of motion, no deformities  : Exam deferred.     No Marfan stigmata: kyphoscoliosis, high-arched palate, pectus excavatuM, arachnodactyly, arm span > height, hyperlaxity, myopia, MVP, aortic insufficieny)  Eyes: normal fundoscopic and pupils  Cardiovascular: normal PMI, simultaneous femoral/radial pulses, no murmurs (standing, supine, Valsalva)  Skin: no HSV, MRSA, tinea corporis  Musculoskeletal    Neck: normal    Back: normal    Shoulder/arm: normal    Elbow/forearm: normal    Wrist/hand/fingers: normal    Hip/thigh: normal    Knee: normal    Leg/ankle: normal    Foot/toes: normal    Functional (Single Leg Hop or Squat): normal      Apolonia Ordoñez MD  [facility]

## 2022-11-20 ENCOUNTER — HEALTH MAINTENANCE LETTER (OUTPATIENT)
Age: 22
End: 2022-11-20

## 2023-02-07 NOTE — MR AVS SNAPSHOT
After Visit Summary   10/12/2018    Samra Ramirez    MRN: [id number]           Patient Information     Date Of Birth          2000        Visit Information        Provider Department      10/12/2018 3:00 PM [facility] IMMUNIZATION NURSE [facility]        Today's Diagnoses     Need for prophylactic vaccination and inoculation against influenza    -  1       Follow-ups after your visit        Who to contact     If you have questions or need follow up information about today's clinic visit or your schedule please contact [facility] directly at [phone].  Normal or non-critical lab and imaging results will be communicated to you by [employer], letter or phone within 4 business days after the clinic has received the results. If you do not hear from us within 7 days, please contact the clinic through [employer] or phone. If you have a critical or abnormal lab result, we will notify you by phone as soon as possible.  Submit refill requests through [employer] or call your pharmacy and they will forward the refill request to us. Please allow 3 business days for your refill to be completed.          Additional Information About Your Visit        MyChart Information     [employer] gives you secure access to your electronic health record. If you see a primary care provider, you can also send messages to your care team and make appointments. If you have questions, please call your primary care clinic.  If you do not have a primary care provider, please call [phone] and they will assist you.        Care EveryWhere ID     This is your Care EveryWhere ID. This could be used by other organizations to access your [facility] medical records  [id number]         Blood Pressure from Last 3 Encounters:   08/22/18 120/79   07/20/16 127/77   12/02/14 118/60    Weight from Last 3 Encounters:   08/22/18 154 lb 3.2 oz (69.9 kg) (87 %)*   07/20/16 152 lb (68.9 kg) (89 %)*    12/02/14 143 lb (64.9 kg) (89 %)*     * Growth percentiles are based on [facility] 2-20 Years data.              We Performed the Following     FLU VACCINE, SPLIT VIRUS, IM (QUADRIVALENT) [34325]- >3 YRS     MEN B, IM (10 - 25 YRS) - Bexsero     SCREENING QUESTIONS FOR PED IMMUNIZATIONS     Vaccine Administration, Each Additional [19360]     Vaccine Administration, Initial [78421]        Primary Care Provider Office Phone # Fax #    Ya Ansari [phone] [address] [facility] [address]        Equal Access to Services     [facility] : Hadii aad ku hadasho Soomaali, waaxda luqadaha, qaybta kaalmada adeegyada, vaishali ramirez adekathi clark . So [facility] [phone].    ATENCIÓN: Si habla español, tiene a ty disposición servicios gratuitos de asistencia lingüística. Llame al [phone].    We comply with applicable federal civil rights laws and [place] laws. We do not discriminate on the basis of race, color, national origin, age, disability, sex, sexual orientation, or gender identity.            Thank you!     Thank you for choosing [facility]  for your care. Our goal is always to provide you with excellent care. Hearing back from our patients is one way we can continue to improve our services. Please take a few minutes to complete the written survey that you may receive in the mail after your visit with us. Thank you!             Your Updated Medication List - Protect others around you: Learn how to safely use, store and throw away your medicines at www.disposemymeds.org.          This list is accurate as of 10/12/18  3:24 PM.  Always use your most recent med list.                   Brand Name Dispense Instructions for use Diagnosis    clindamycin-benzoyl Per (Refr) 1.2-5 % Gel     45 g    Apply topically At Bedtime For acne    Other acne          No

## 2023-11-25 ENCOUNTER — HEALTH MAINTENANCE LETTER (OUTPATIENT)
Age: 23
End: 2023-11-25